# Patient Record
Sex: MALE | Race: BLACK OR AFRICAN AMERICAN | Employment: FULL TIME | URBAN - METROPOLITAN AREA
[De-identification: names, ages, dates, MRNs, and addresses within clinical notes are randomized per-mention and may not be internally consistent; named-entity substitution may affect disease eponyms.]

---

## 2017-01-04 ENCOUNTER — OFFICE VISIT (OUTPATIENT)
Dept: BARIATRICS/WEIGHT MGMT | Age: 37
End: 2017-01-04

## 2017-01-04 VITALS
DIASTOLIC BLOOD PRESSURE: 89 MMHG | HEART RATE: 68 BPM | HEIGHT: 68 IN | SYSTOLIC BLOOD PRESSURE: 131 MMHG | WEIGHT: 248 LBS | BODY MASS INDEX: 37.59 KG/M2

## 2017-01-04 DIAGNOSIS — R73.03 PRE-DIABETES: ICD-10-CM

## 2017-01-04 DIAGNOSIS — E66.9 OBESITY (BMI 30-39.9): ICD-10-CM

## 2017-01-04 DIAGNOSIS — E78.5 HYPERLIPIDEMIA, UNSPECIFIED: ICD-10-CM

## 2017-01-04 DIAGNOSIS — Z90.3 S/P GASTRECTOMY: Primary | ICD-10-CM

## 2017-01-04 PROCEDURE — 99213 OFFICE O/P EST LOW 20 MIN: CPT | Performed by: NURSE PRACTITIONER

## 2017-01-04 RX ORDER — CYCLOBENZAPRINE HCL 5 MG
TABLET ORAL
Refills: 0 | COMMUNITY
Start: 2016-11-22

## 2017-01-04 RX ORDER — AMLODIPINE BESYLATE 5 MG/1
10 TABLET ORAL
COMMUNITY
Start: 2016-12-07

## 2017-01-04 RX ORDER — HYDROCODONE BITARTRATE AND ACETAMINOPHEN 5; 325 MG/1; MG/1
TABLET ORAL
Refills: 0 | COMMUNITY
Start: 2016-11-22 | End: 2019-03-18 | Stop reason: ALTCHOICE

## 2017-01-04 ASSESSMENT — ENCOUNTER SYMPTOMS
EYES NEGATIVE: 1
ALLERGIC/IMMUNOLOGIC NEGATIVE: 1
RESPIRATORY NEGATIVE: 1
GASTROINTESTINAL NEGATIVE: 1

## 2017-08-07 ENCOUNTER — OFFICE VISIT (OUTPATIENT)
Dept: BARIATRICS/WEIGHT MGMT | Age: 37
End: 2017-08-07

## 2017-08-07 VITALS
BODY MASS INDEX: 37.44 KG/M2 | HEIGHT: 68 IN | DIASTOLIC BLOOD PRESSURE: 80 MMHG | RESPIRATION RATE: 16 BRPM | WEIGHT: 247 LBS | HEART RATE: 86 BPM | SYSTOLIC BLOOD PRESSURE: 136 MMHG

## 2017-08-07 DIAGNOSIS — E78.5 HYPERLIPIDEMIA, UNSPECIFIED: ICD-10-CM

## 2017-08-07 DIAGNOSIS — E66.9 OBESITY (BMI 30-39.9): ICD-10-CM

## 2017-08-07 DIAGNOSIS — R73.03 PRE-DIABETES: ICD-10-CM

## 2017-08-07 DIAGNOSIS — Z90.3 S/P GASTRECTOMY: Primary | ICD-10-CM

## 2017-08-07 PROCEDURE — 99213 OFFICE O/P EST LOW 20 MIN: CPT | Performed by: NURSE PRACTITIONER

## 2017-08-07 RX ORDER — ANASTROZOLE 1 MG/1
TABLET ORAL
Refills: 6 | COMMUNITY
Start: 2017-07-05 | End: 2019-03-18 | Stop reason: ALTCHOICE

## 2017-08-07 RX ORDER — BENAZEPRIL HYDROCHLORIDE 20 MG/1
TABLET ORAL
Refills: 2 | COMMUNITY
Start: 2017-07-14 | End: 2018-02-05 | Stop reason: ALTCHOICE

## 2017-08-07 ASSESSMENT — ENCOUNTER SYMPTOMS
GASTROINTESTINAL NEGATIVE: 1
ALLERGIC/IMMUNOLOGIC NEGATIVE: 1
RESPIRATORY NEGATIVE: 1
EYES NEGATIVE: 1

## 2017-11-08 ENCOUNTER — OFFICE VISIT (OUTPATIENT)
Dept: BARIATRICS/WEIGHT MGMT | Age: 37
End: 2017-11-08

## 2017-11-08 VITALS
HEART RATE: 96 BPM | DIASTOLIC BLOOD PRESSURE: 74 MMHG | RESPIRATION RATE: 16 BRPM | SYSTOLIC BLOOD PRESSURE: 110 MMHG | HEIGHT: 68 IN | WEIGHT: 235 LBS | BODY MASS INDEX: 35.61 KG/M2

## 2017-11-08 DIAGNOSIS — E78.5 HYPERLIPIDEMIA, UNSPECIFIED HYPERLIPIDEMIA TYPE: ICD-10-CM

## 2017-11-08 DIAGNOSIS — Z90.3 S/P GASTRECTOMY: Primary | ICD-10-CM

## 2017-11-08 DIAGNOSIS — E66.9 OBESITY (BMI 30-39.9): ICD-10-CM

## 2017-11-08 DIAGNOSIS — R73.03 PRE-DIABETES: ICD-10-CM

## 2017-11-08 PROCEDURE — 99213 OFFICE O/P EST LOW 20 MIN: CPT | Performed by: NURSE PRACTITIONER

## 2017-11-08 RX ORDER — ERGOCALCIFEROL 1.25 MG/1
50000 CAPSULE ORAL
COMMUNITY
Start: 2017-10-20

## 2017-11-08 ASSESSMENT — ENCOUNTER SYMPTOMS
EYES NEGATIVE: 1
RESPIRATORY NEGATIVE: 1
ALLERGIC/IMMUNOLOGIC NEGATIVE: 1
GASTROINTESTINAL NEGATIVE: 1

## 2017-11-08 NOTE — PROGRESS NOTES
T PO QOD, Disp: , Rfl: 6    benazepril (LOTENSIN) 20 MG tablet, TK 1 T PO D, Disp: , Rfl: 2    amLODIPine (NORVASC) 5 MG tablet, Take 10 mg by mouth , Disp: , Rfl:     cyclobenzaprine (FLEXERIL) 5 MG tablet, TK 1 T PO TID PRN, Disp: , Rfl: 0    HYDROcodone-acetaminophen (NORCO) 5-325 MG per tablet, TK 1 TABLET PO EVERY 4 TO 6 HOURS AS DIRECTED FOR NECK PAIN, Disp: , Rfl: 0    montelukast (SINGULAIR) 10 MG tablet, Take 1 tablet by mouth daily, Disp: , Rfl: 3    Multiple Vitamin (MULTI VITAMIN MENS PO), Take 1 each by mouth daily. , Disp: , Rfl:     albuterol (VENTOLIN HFA) 108 (90 BASE) MCG/ACT inhaler, Inhale 2 puffs into the lungs every 4 hours as needed for Wheezing., Disp: 18 g, Rfl: 4    Cyanocobalamin (VITAMIN B 12) 100 MCG LOZG, Take 2,000 mg by mouth daily. , Disp: , Rfl:     Cholecalciferol (VITAMIN D3) 57510 UNITS CAPS, Take 50,000 Units by mouth daily. , Disp: , Rfl:     Calcium Citrate-Vitamin D (CITRACAL PETITES/VITAMIN D PO), Take 1 tablet by mouth daily. , Disp: , Rfl:     omeprazole (PRILOSEC) 40 MG capsule, Take 1 capsule by mouth daily. , Disp: 30 capsule, Rfl: 2    budesonide-formoterol (SYMBICORT) 160-4.5 MCG/ACT AERO, Inhale 2 puffs into the lungs 2 times daily. , Disp: 3 Inhaler, Rfl: 1    Review of Systems   Constitutional: Negative. HENT: Negative. Eyes: Negative. Respiratory: Negative. Cardiovascular: Negative. Gastrointestinal: Negative. Endocrine: Negative. Genitourinary: Negative. Musculoskeletal: Negative. Skin: Negative. Allergic/Immunologic: Negative. Neurological: Negative. Hematological: Negative. Psychiatric/Behavioral: Negative. Objective:   Physical Exam   Constitutional: He is oriented to person, place, and time. He appears well-developed and well-nourished. HENT:   Head: Normocephalic and atraumatic. Eyes: Conjunctivae are normal. Pupils are equal, round, and reactive to light. Neck: Normal range of motion. Neck supple. Cardiovascular: Normal rate. Pulmonary/Chest: Effort normal.   Abdominal: Soft. Musculoskeletal: Normal range of motion. Neurological: He is alert and oriented to person, place, and time. Skin: Skin is warm and dry. Psychiatric: He has a normal mood and affect. His behavior is normal. Judgment and thought content normal.   Vitals reviewed. Assessment and Plan:   Patient is 3 years 3 months s/p sleeve gastrectomy, down 12 lbs with a total weight loss of 96.5 lbs. The patient's current Body mass index is 35.73 kg/m². (11/8/17). He is doing well, denies n/v/dysphagia or reflux. He is tolerating diet, getting adequate fluids and protein. Doing well with vegan diet. He is exercising with walking daily 3-4 miles. Encouraged continued physical activity. He was only taking one multivitamin and no Citracal, so instructed patient to take two multivitamins and Citracal per day. He did meet with the registered dietitian for continued follow up. I agree with recommendations and plan. Reviewed recent labs completed by PCP. His PCP has started him on a Vitamin D supplement. We will see him back in 3 months for continued follow up. A total of 15 minutes was spent face-to-face with the patient and over half of that time was spent counseling the patient on proper dietary behaviors, exercise and post-op progress.

## 2018-02-05 ENCOUNTER — OFFICE VISIT (OUTPATIENT)
Dept: BARIATRICS/WEIGHT MGMT | Age: 38
End: 2018-02-05

## 2018-02-05 VITALS
WEIGHT: 234 LBS | SYSTOLIC BLOOD PRESSURE: 114 MMHG | HEIGHT: 68 IN | DIASTOLIC BLOOD PRESSURE: 74 MMHG | BODY MASS INDEX: 35.46 KG/M2 | HEART RATE: 93 BPM

## 2018-02-05 DIAGNOSIS — E78.5 HYPERLIPIDEMIA, UNSPECIFIED HYPERLIPIDEMIA TYPE: ICD-10-CM

## 2018-02-05 DIAGNOSIS — Z90.3 S/P GASTRECTOMY: Primary | ICD-10-CM

## 2018-02-05 DIAGNOSIS — E55.9 VITAMIN D DEFICIENCY: ICD-10-CM

## 2018-02-05 DIAGNOSIS — I10 ESSENTIAL HYPERTENSION: ICD-10-CM

## 2018-02-05 PROCEDURE — 99213 OFFICE O/P EST LOW 20 MIN: CPT | Performed by: NURSE PRACTITIONER

## 2018-02-05 RX ORDER — BENAZEPRIL HYDROCHLORIDE AND HYDROCHLOROTHIAZIDE 20; 12.5 MG/1; MG/1
TABLET ORAL
COMMUNITY
Start: 2018-01-31 | End: 2019-07-22 | Stop reason: ALTCHOICE

## 2018-02-05 ASSESSMENT — ENCOUNTER SYMPTOMS
GASTROINTESTINAL NEGATIVE: 1
RESPIRATORY NEGATIVE: 1
EYES NEGATIVE: 1
ALLERGIC/IMMUNOLOGIC NEGATIVE: 1

## 2018-02-05 NOTE — PATIENT INSTRUCTIONS
Patient received dietary handouts and education. Diet tips to help make you successful postoperatively  Eating habits after surgery will have to be a permanent and long-term change. Eating habits are so ingrained that it can be difficult to change. It is important to maintain these new eating habits after surgery. Also remember that overall health, age, and genetics make each persons weight loss progress different. Do not compare your progress, the amount you eat, or exercise to other patients.  Protein first at every meal- Eat the protein portion of your meal first. Eating protein helps the body feel full and sends a signal to stop eating. Protein is very important in building tissue in the body.  Eat at least 4 times per day- This includes protein supplements and small meals with a high amount of protein   Chewing your food thoroughly- Eating too quickly and improper chewing can cause pain and vomiting after surgery.  Slowing down the speed at which you eat- Refill your fork only after you swallow. Adopt a new pattern of eating by taking a bite of food and putting your utensil down between bites. This will help to reduce the feeling of food being stuck.    Drink water and start drinking fluids slowly- Drink at least 48 ounces per day minimum. Sip fluids as if they were hot beverages. If you find it difficult to stop gulping liquids, try using a sippy cup or a sport top water bottle.  Make sure you are eating meals without drinking fluids- After surgery you will not be allowed to drink fluids 30 minutes before, during, or 30 minutes after your meal (30/30/30 rule). This will be a life-long behavior change. The reason for the rule is to keep food from passing through your smaller stomach more rapidly.  This will cause you to feel hungry shortly after your meal.   Continue to avoid caffeine and carbonated beverages- Caffeine acts as a diuretic and can be dehydrating as well as irritating to the lining of the stomach. Carbonated beverages release gas and can expand the stomach.  Continue to keep temptation from your kitchen- Keep your pantry and kitchen cabinets cleaned out of those dangerous foods that might tempt you after surgery (chips, cookies, candy, etc.).  Continue to increase your exercise program- Increase your daily physical activity. Aim for 5-6 days per week for 30 minutes. Walking is an easy way to get started with exercising. Exercise is going to be a regular part of your life after surgery.  Make sure you have a good support system- There will be many changes and adjustments to make after surgery. It is important to have a supportive friend, family member or co-worker, etc. with whom you can talk. Continue to attend [facility]) Weight Management support groups as they can be helpful in maintaining behaviors. In addition, it is the responsibility of the patient to schedule and follow up on labs and tests completed after surgery. Results will be reviewed at each visit.

## 2018-02-05 NOTE — PROGRESS NOTES
capsule, Take 50,000 Units by mouth, Disp: , Rfl:     anastrozole (ARIMIDEX) 1 MG tablet, TK 1 T PO QOD, Disp: , Rfl: 6    amLODIPine (NORVASC) 5 MG tablet, Take 10 mg by mouth , Disp: , Rfl:     cyclobenzaprine (FLEXERIL) 5 MG tablet, TK 1 T PO TID PRN, Disp: , Rfl: 0    HYDROcodone-acetaminophen (NORCO) 5-325 MG per tablet, TK 1 TABLET PO EVERY 4 TO 6 HOURS AS DIRECTED FOR NECK PAIN, Disp: , Rfl: 0    montelukast (SINGULAIR) 10 MG tablet, Take 1 tablet by mouth daily, Disp: , Rfl: 3    Multiple Vitamin (MULTI VITAMIN MENS PO), Take 1 each by mouth daily. , Disp: , Rfl:     albuterol (VENTOLIN HFA) 108 (90 BASE) MCG/ACT inhaler, Inhale 2 puffs into the lungs every 4 hours as needed for Wheezing., Disp: 18 g, Rfl: 4    Cyanocobalamin (VITAMIN B 12) 100 MCG LOZG, Take 2,000 mg by mouth daily. , Disp: , Rfl:     Cholecalciferol (VITAMIN D3) 79808 UNITS CAPS, Take 50,000 Units by mouth daily. , Disp: , Rfl:     Calcium Citrate-Vitamin D (CITRACAL PETITES/VITAMIN D PO), Take 1 tablet by mouth daily. , Disp: , Rfl:     omeprazole (PRILOSEC) 40 MG capsule, Take 1 capsule by mouth daily. , Disp: 30 capsule, Rfl: 2    budesonide-formoterol (SYMBICORT) 160-4.5 MCG/ACT AERO, Inhale 2 puffs into the lungs 2 times daily. , Disp: 3 Inhaler, Rfl: 1    Review of Systems   Constitutional: Negative. HENT: Negative. Eyes: Negative. Respiratory: Negative. Cardiovascular: Negative. Gastrointestinal: Negative. Endocrine: Negative. Genitourinary: Negative. Musculoskeletal: Negative. Skin: Negative. Allergic/Immunologic: Negative. Neurological: Negative. Hematological: Negative. Psychiatric/Behavioral: Negative. Objective:   Physical Exam   Constitutional: He is oriented to person, place, and time. He appears well-developed and well-nourished. HENT:   Head: Normocephalic and atraumatic. Eyes: Conjunctivae are normal. Pupils are equal, round, and reactive to light.    Neck: Normal range

## 2018-06-06 ENCOUNTER — OFFICE VISIT (OUTPATIENT)
Dept: BARIATRICS/WEIGHT MGMT | Age: 38
End: 2018-06-06

## 2018-06-06 VITALS
RESPIRATION RATE: 16 BRPM | HEART RATE: 94 BPM | DIASTOLIC BLOOD PRESSURE: 70 MMHG | BODY MASS INDEX: 34.71 KG/M2 | WEIGHT: 229 LBS | SYSTOLIC BLOOD PRESSURE: 118 MMHG | HEIGHT: 68 IN

## 2018-06-06 DIAGNOSIS — E78.5 HYPERLIPIDEMIA, UNSPECIFIED HYPERLIPIDEMIA TYPE: ICD-10-CM

## 2018-06-06 DIAGNOSIS — Z90.3 S/P GASTRECTOMY: Primary | ICD-10-CM

## 2018-06-06 DIAGNOSIS — R73.03 PRE-DIABETES: ICD-10-CM

## 2018-06-06 DIAGNOSIS — I10 ESSENTIAL HYPERTENSION: ICD-10-CM

## 2018-06-06 DIAGNOSIS — E66.9 OBESITY (BMI 30-39.9): ICD-10-CM

## 2018-06-06 PROCEDURE — 99213 OFFICE O/P EST LOW 20 MIN: CPT | Performed by: NURSE PRACTITIONER

## 2018-09-17 ENCOUNTER — OFFICE VISIT (OUTPATIENT)
Dept: BARIATRICS/WEIGHT MGMT | Age: 38
End: 2018-09-17

## 2018-09-17 VITALS
OXYGEN SATURATION: 99 % | WEIGHT: 214.4 LBS | RESPIRATION RATE: 20 BRPM | DIASTOLIC BLOOD PRESSURE: 83 MMHG | BODY MASS INDEX: 32.49 KG/M2 | HEIGHT: 68 IN | SYSTOLIC BLOOD PRESSURE: 137 MMHG | HEART RATE: 77 BPM

## 2018-09-17 DIAGNOSIS — R73.03 PRE-DIABETES: ICD-10-CM

## 2018-09-17 DIAGNOSIS — I10 ESSENTIAL HYPERTENSION: ICD-10-CM

## 2018-09-17 DIAGNOSIS — E78.5 HYPERLIPIDEMIA, UNSPECIFIED HYPERLIPIDEMIA TYPE: ICD-10-CM

## 2018-09-17 DIAGNOSIS — E66.9 OBESITY (BMI 30.0-34.9): ICD-10-CM

## 2018-09-17 DIAGNOSIS — Z90.3 S/P GASTRECTOMY: Primary | ICD-10-CM

## 2018-09-17 PROCEDURE — 99213 OFFICE O/P EST LOW 20 MIN: CPT | Performed by: NURSE PRACTITIONER

## 2018-09-17 ASSESSMENT — ENCOUNTER SYMPTOMS
ALLERGIC/IMMUNOLOGIC NEGATIVE: 1
RESPIRATORY NEGATIVE: 1
GASTROINTESTINAL NEGATIVE: 1
EYES NEGATIVE: 1

## 2018-09-17 NOTE — PROGRESS NOTES
[facility]) Physicians   [facility]    Subjective:      Patient ID: Semaj Pruett is a 41 y.o. male    HPI    6 years 1 month s/p sleeve gastrectomy    Semaj Pruett is a very pleasant 41 y.o. obese male , Body mass index is 32.6 kg/m². Patient denies any nausea, vomiting, fevers, chills, shortness of breath, chest pain, constipation or urinary symptoms. Denies any heartburn nor dysphagia. Past Medical History:   Diagnosis Date    Allergic rhinitis     Asthma 1982    B12 deficiency     Bipolar affect, depressed ([facility] [address]     Dr. Myriam Hernandes Hyperlipidemia     Migraine     Morbid obesity ([facility] [address]     Panic disorder     Pre-diabetes      Past Surgical History:   Procedure Laterality Date    SLEEVE GASTRECTOMY  8/26/2014    LAPAROSCOPIC    TONSILLECTOMY  0273    UMBILICAL HERNIA REPAIR  1998     Family History   Problem Relation Age of Onset    Diabetes Mother     High Blood Pressure Mother     High Cholesterol Mother     Asthma Mother     Diabetes Father     High Blood Pressure Father     High Cholesterol Father     Diabetes Maternal Aunt     Seizures Brother     Substance Abuse Brother     Glaucoma Brother      Social History   Substance Use Topics    Smoking status: Former Smoker     Quit date: 1/29/2013    Smokeless tobacco: Never Used    Alcohol use Yes      Comment: weekends - 2-3 drinks     I counseled the patient on the importance of not smoking and risks of ETOH. Allergies   Allergen Reactions    Banana Anaphylaxis    Peanuts [Peanut Oil] Anaphylaxis    Shellfish-Derived Products Anaphylaxis    Penicillins      Unsure of reaction     Vitals:    09/17/18 1504   BP: 137/83   Site: Right Wrist   Position: Sitting   Pulse: 77   Resp: 20   SpO2: 99%   Weight: 214 lb 6.4 oz (97.3 kg)   Height: 5' 8\" (1.727 m)     Body mass index is 32.6 kg/m².     Current Outpatient Prescriptions:     benazepril-hydrochlorthiazide (LOTENSIN HCT) 20-12.5 MG per tablet, Take by mouth, Cardiovascular: Normal rate. Pulmonary/Chest: Effort normal.   Abdominal: Soft. Musculoskeletal: Normal range of motion. Neurological: He is alert and oriented to person, place, and time. Skin: Skin is warm and dry. Psychiatric: He has a normal mood and affect. His behavior is normal. Judgment and thought content normal.   Vitals reviewed. Assessment and Plan:   Patient is 6 years 1 month s/p sleeve gastrectomy, down 14.6 lbs with a total weight loss of 117.1 lbs. The patient's current Body mass index is 32.6 kg/m². (9/17/18). He is doing well, denies n/v/dysphagia or reflux. He is tolerating diet, getting adequate fluids and protein. Needs to be more consistent with following the 30-30-30 rule and watch hi alcohol intake. He is exercising with walking at work getting generally 10,000 steps on weekdays and 20,000 on the weekend days. Encouraged continued physical activity. He is still only taking one multivitamin so instructed him to make sure he is taking tweo daily. He is taking two Citracal daily as instructed. He did meet with the registered dietitian for continued follow up. I agree with recommendations and plan. We will see him back in 3 months for continued follow up. Will repeat nutritional labs at next visit. A total of 15 minutes was spent face-to-face with the patient and over half of that time was spent counseling the patient on proper dietary behaviors, exercise and post-op progress.

## 2018-09-17 NOTE — PROGRESS NOTES
Dietary Assessment Note    Vitals:   Vitals:    09/17/18 1504   Resp: 20   Weight: 214 lb 6.4 oz (97.3 kg)   Height: 5' 8\" (1.727 m)    Patient lost 14.6 lbs over past 3.5 months. 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 On most week days gets ~ 10 K. Nutrition Assessment:  4 year s/p sleeve post-op visit. Pt follows a vegan diet. Is allergic to all nuts except coconut. Pt does not track calories. He has been trying to follow a lower carb diet (trying to stick around 20 g/CHO). Reports his clothes are fitting looser on him. Breakfast: nothing     Snack: nothing     Lunch: 11 a.m. veggies (kale, broccoli, green beans) with vegan sausages     Snack: veggies (kale, broccoli, green beans) with vegan sausages OR pumpkin seeds     Dinner: veggies (kale, broccoli, green beans) with vegan sausages     Snack:  Might have vegan chicken strips (6 strips)     Fluids: Water, SF water flavorings, alcohol (wine OR gin)     Amount able to eat at a time? 1 cup      Following 30-30-30 Rule? Has to eat fast at work, when at home eats in 30-45 minutes, does eat and drink together and does not feel uncomfortable. Food Intolerances/issues: none    Client Concerns: nothing     Goals: eat at least 4 x day, take MVI bid, separate eating and drinking, limit alcohol.      Plan: F/U at 5 years      Steven Palomo

## 2018-12-17 ENCOUNTER — OFFICE VISIT (OUTPATIENT)
Dept: BARIATRICS/WEIGHT MGMT | Age: 38
End: 2018-12-17
Payer: COMMERCIAL

## 2018-12-17 VITALS
DIASTOLIC BLOOD PRESSURE: 77 MMHG | SYSTOLIC BLOOD PRESSURE: 127 MMHG | OXYGEN SATURATION: 98 % | HEIGHT: 68 IN | HEART RATE: 80 BPM | BODY MASS INDEX: 32.77 KG/M2 | WEIGHT: 216.2 LBS | RESPIRATION RATE: 19 BRPM

## 2018-12-17 DIAGNOSIS — E78.5 HYPERLIPIDEMIA, UNSPECIFIED HYPERLIPIDEMIA TYPE: ICD-10-CM

## 2018-12-17 DIAGNOSIS — Z90.3 S/P GASTRECTOMY: Primary | ICD-10-CM

## 2018-12-17 DIAGNOSIS — I10 ESSENTIAL HYPERTENSION: ICD-10-CM

## 2018-12-17 DIAGNOSIS — E66.9 OBESITY (BMI 30-39.9): ICD-10-CM

## 2018-12-17 PROCEDURE — 99213 OFFICE O/P EST LOW 20 MIN: CPT | Performed by: NURSE PRACTITIONER

## 2018-12-17 ASSESSMENT — ENCOUNTER SYMPTOMS
EYES NEGATIVE: 1
ALLERGIC/IMMUNOLOGIC NEGATIVE: 1
GASTROINTESTINAL NEGATIVE: 1
RESPIRATORY NEGATIVE: 1

## 2018-12-17 NOTE — PATIENT INSTRUCTIONS
Patient received dietary handouts and education. Diet tips to help make you successful postoperatively  Eating habits after surgery will have to be a permanent and long-term change. Eating habits are so ingrained that it can be difficult to change. It is important to maintain these new eating habits after surgery. Also remember that overall health, age, and genetics make each persons weight loss progress different. Do not compare your progress, the amount you eat, or exercise to other patients.  Protein first at every meal- Eat the protein portion of your meal first. Eating protein helps the body feel full and sends a signal to stop eating. Protein is very important in building tissue in the body.  Eat at least 4 times per day- This includes protein supplements and small meals with a high amount of protein   Chewing your food thoroughly- Eating too quickly and improper chewing can cause pain and vomiting after surgery.  Slowing down the speed at which you eat- Refill your fork only after you swallow. Adopt a new pattern of eating by taking a bite of food and putting your utensil down between bites. This will help to reduce the feeling of food being stuck.    Drink water and start drinking fluids slowly- Drink at least 48 ounces per day minimum. Sip fluids as if they were hot beverages. If you find it difficult to stop gulping liquids, try using a sippy cup or a sport top water bottle.  Make sure you are eating meals without drinking fluids- After surgery you will not be allowed to drink fluids 30 minutes before, during, or 30 minutes after your meal (30/30/30 rule). This will be a life-long behavior change. The reason for the rule is to keep food from passing through your smaller stomach more rapidly.  This will cause you to feel hungry shortly after your meal.   Continue to avoid caffeine and carbonated beverages- Caffeine acts as a diuretic and can be dehydrating as well as irritating to the lining of the stomach. Carbonated beverages release gas and can expand the stomach.  Continue to keep temptation from your kitchen- Keep your pantry and kitchen cabinets cleaned out of those dangerous foods that might tempt you after surgery (chips, cookies, candy, etc.).  Continue to increase your exercise program- Increase your daily physical activity. Aim for 5-6 days per week for 30 minutes. Walking is an easy way to get started with exercising. Exercise is going to be a regular part of your life after surgery.  Make sure you have a good support system- There will be many changes and adjustments to make after surgery. It is important to have a supportive friend, family member or co-worker, etc. with whom you can talk. Continue to attend [facility]) Weight Management support groups as they can be helpful in maintaining behaviors. In addition, it is the responsibility of the patient to schedule and follow up on labs and tests completed after surgery. Results will be reviewed at each visit.

## 2018-12-17 NOTE — PROGRESS NOTES
Dietary Assessment Note    Vitals:   Vitals:    12/17/18 1510   Height: 5' 8\" (1.727 m)    Patient gained 1.8 lbs over past 3 months. Total Weight Loss: 115.3 lbs     Labs reviewed: 5/2/18 K 3.5 L, Vit D 25-hydroxy L (increasing), Glu 87 WNL, Vit B12 WNL. Chol 215 H (increasing), LDL H (increasing),  WNL    Protein intake: Patient not tracking    Fluid intake: >64 oz/day    Multivitamin/mineral intake: 1 Vegan MVI/day     Calcium intake:  2 citracal petite     Other:  Vit D, B12    Exercise:  Had been on elliptical on a regular basis but then on Thanksgiving got a pinched nerve in trapezius and went to ER. Started PT last week- plans to go 2 x week. 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 Nutrition Assessment: 6 year 3 mo s/p sleeve post-op visit. Pt follows a vegan diet. Is allergic to all nuts except coconut, fish, bananas, all seafood. Tries to follow a lower CHO diet and goal of staying around 20 g/CHO/day- one of the reasons he does this is so he avoids binging on high CHO foods he enjoys. Trying to stay away from soy which he knows is a large component of vegan foods. Reports he wants to get back on track with taking apple cider vinegar which he feels is helpful with his weight loss. Pt feels like he has plenty of food options however RD concerned about balance in diet d/t Vegan food preference/trying to limit CHO/ several food allergies/avoiding soy which is leaving him with limited food choices.      Breakfast: Vegan Protein shakes made with water or coconut milk     Snack: nothing     Lunch: (11 a.m.) leftovers from dinner     Snack: sometimes- may not feel hungry (animal crackers/saltines/chips)     Dinner: pizza OR vegan fish filet in air fryer with broccoli and michelle kraut OR chili (pickles/jalapenos/onions) with tofu crumbles - limiting beans d/t trying to follow lower CHO diet (but states this is

## 2019-03-18 ENCOUNTER — OFFICE VISIT (OUTPATIENT)
Dept: BARIATRICS/WEIGHT MGMT | Age: 39
End: 2019-03-18
Payer: COMMERCIAL

## 2019-03-18 VITALS
BODY MASS INDEX: 32.46 KG/M2 | SYSTOLIC BLOOD PRESSURE: 115 MMHG | WEIGHT: 214.2 LBS | HEART RATE: 90 BPM | HEIGHT: 68 IN | DIASTOLIC BLOOD PRESSURE: 74 MMHG

## 2019-03-18 DIAGNOSIS — Z90.3 S/P GASTRECTOMY: Primary | ICD-10-CM

## 2019-03-18 DIAGNOSIS — R73.03 PRE-DIABETES: ICD-10-CM

## 2019-03-18 DIAGNOSIS — E66.9 OBESITY (BMI 30-39.9): ICD-10-CM

## 2019-03-18 DIAGNOSIS — E78.5 HYPERLIPIDEMIA, UNSPECIFIED HYPERLIPIDEMIA TYPE: ICD-10-CM

## 2019-03-18 DIAGNOSIS — I10 ESSENTIAL HYPERTENSION: ICD-10-CM

## 2019-03-18 PROCEDURE — 99213 OFFICE O/P EST LOW 20 MIN: CPT | Performed by: NURSE PRACTITIONER

## 2019-03-18 RX ORDER — NAPROXEN 500 MG/1
500 TABLET ORAL 2 TIMES DAILY WITH MEALS
COMMUNITY

## 2019-03-18 ASSESSMENT — ENCOUNTER SYMPTOMS
RESPIRATORY NEGATIVE: 1
EYES NEGATIVE: 1
GASTROINTESTINAL NEGATIVE: 1
ALLERGIC/IMMUNOLOGIC NEGATIVE: 1

## 2019-06-14 ASSESSMENT — ENCOUNTER SYMPTOMS
RESPIRATORY NEGATIVE: 1
ALLERGIC/IMMUNOLOGIC NEGATIVE: 1
EYES NEGATIVE: 1
GASTROINTESTINAL NEGATIVE: 1

## 2019-07-22 ENCOUNTER — OFFICE VISIT (OUTPATIENT)
Dept: BARIATRICS/WEIGHT MGMT | Age: 39
End: 2019-07-22
Payer: COMMERCIAL

## 2019-07-22 VITALS
DIASTOLIC BLOOD PRESSURE: 80 MMHG | HEART RATE: 70 BPM | BODY MASS INDEX: 34.1 KG/M2 | HEIGHT: 68 IN | RESPIRATION RATE: 16 BRPM | SYSTOLIC BLOOD PRESSURE: 124 MMHG | WEIGHT: 225 LBS

## 2019-07-22 DIAGNOSIS — E66.9 OBESITY (BMI 30.0-34.9): ICD-10-CM

## 2019-07-22 DIAGNOSIS — Z90.3 S/P GASTRECTOMY: ICD-10-CM

## 2019-07-22 DIAGNOSIS — I10 ESSENTIAL HYPERTENSION: ICD-10-CM

## 2019-07-22 DIAGNOSIS — R73.03 PRE-DIABETES: ICD-10-CM

## 2019-07-22 DIAGNOSIS — E78.5 HYPERLIPIDEMIA, UNSPECIFIED HYPERLIPIDEMIA TYPE: Primary | ICD-10-CM

## 2019-07-22 PROCEDURE — 99213 OFFICE O/P EST LOW 20 MIN: CPT | Performed by: NURSE PRACTITIONER

## 2019-07-22 NOTE — PROGRESS NOTES
Dietary Assessment Note    Vitals:   Vitals:    07/22/19 1534   BP: 124/80   Pulse: 70   Resp: 16   Weight: 225 lb (102.1 kg)   Height: 5' 8\" (1.727 m)    Patient gained 10.8 lbs over 4 months. Total Weight Loss: 102.5 lbs    Labs reviewed: no lab studies available for review at time of visit    Protein intake: Patient not tracking. Feels he reaches goal and will use protein shake on busy days. Fluid intake: 48-64 oz/day water, tea/coffee w/ sf syrup + splenda, regular lemonade, + 1 alcoholic beverage/day     Multivitamin/mineral intake: 2 Vegan MVI/day      Calcium intake:  2 citracal petite      Other: Vit D, B12     Exercise: Lifting restriction <20# for another month. Will play basketball, dose shows/dancing, 10K steps/day     Nutrition Assessment: 4 years 11 months post-op visit. Pt follows a vegan diet. Is allergic to all nuts except coconut, fish, bananas, all seafood.   Breakfast: Just coffee - doesn't feel hungry until 11 AM    Lunch: 11 AM: Leftovers ([employer] spaghetti)    Snack: Maybe \"meatloaf\" made w/ chickpeas + national yeast + wheat gluten     Dinner: 5-6:30 PM: Lettuce wrap w/ vegan lunch meat/onion/pickles/mustard/vegan garcia + beans     Snack: Sometimes Chips OR Cookies     Amount able to eat per sitting: ~1.5 cups  Following 30/30/30 rule: No - Feels collins and less tempted to over eat. Eats quickly.     Food Intolerances/issues: none    Client Concerns: none    Goals: Personal goal: <200 lbs  - Eat within one hour of waking up  - Eliminate lemonade and alcohol   - Increase variety of produce w/ all meals and snacks   - Increase exercise to PT exercise 15-30 minutes daily     Plan: Follow up at 6 years post op and as needed    [employer]

## 2020-01-24 ASSESSMENT — ENCOUNTER SYMPTOMS
EYES NEGATIVE: 1
GASTROINTESTINAL NEGATIVE: 1
ALLERGIC/IMMUNOLOGIC NEGATIVE: 1
RESPIRATORY NEGATIVE: 1

## 2020-01-24 NOTE — PROGRESS NOTES
[facility]) Physicians   [employer]    Subjective:      Patient ID: Rubi Starkey is a 42 y.o. male    HPI    5 years 5 months s/p sleeve gastrectomy    Rubi Starkey is a 42 y.o. obese male , Body mass index is 31.63 kg/m². Patient denies any nausea, vomiting, fevers, chills, shortness of breath, chest pain, constipation or urinary symptoms. Denies any heartburn nor dysphagia. Past Medical History:   Diagnosis Date    Allergic rhinitis     Asthma 1982    B12 deficiency     Bipolar affect, depressed ([facility] [address]     Dr. Ramirez Broussard Hyperlipidemia     Migraine     Morbid obesity ([facility] [address]     Panic disorder     Pre-diabetes      Past Surgical History:   Procedure Laterality Date    SLEEVE GASTRECTOMY  2014    LAPAROSCOPIC    TONSILLECTOMY  4680    UMBILICAL HERNIA REPAIR       Family History   Problem Relation Age of Onset    Diabetes Mother     High Blood Pressure Mother     High Cholesterol Mother     Asthma Mother     Diabetes Father     High Blood Pressure Father     High Cholesterol Father     Diabetes Maternal Aunt     Seizures Brother     Substance Abuse Brother     Glaucoma Brother      Social History     Tobacco Use    Smoking status: Former Smoker     Last attempt to quit: 2013     Years since quittin.9    Smokeless tobacco: Never Used   Substance Use Topics    Alcohol use: Yes     Comment: weekends - 2-3 drinks     I counseled the patient on the importance of not smoking and risks of ETOH. Allergies   Allergen Reactions    Banana Anaphylaxis    Peanuts [Peanut Oil] Anaphylaxis    Shellfish-Derived Products Anaphylaxis    Benazepril-Hydrochlorothiazide Swelling    Penicillins      Unsure of reaction     Vitals:    20 1552   BP: 137/80   Pulse: 108   Weight: 208 lb (94.3 kg)   Height: 5' 8\" (1.727 m)     Body mass index is 31.63 kg/m².     Current Outpatient Medications:     naproxen (NAPROSYN) 500 MG tablet, Take 500 mg by mouth 2 times daily (with meals) Indications: PRN, Disp: , Rfl:     vitamin D (ERGOCALCIFEROL) 47480 units CAPS capsule, Take 50,000 Units by mouth, Disp: , Rfl:     amLODIPine (NORVASC) 5 MG tablet, Take 10 mg by mouth , Disp: , Rfl:     cyclobenzaprine (FLEXERIL) 5 MG tablet, TK 1 T PO TID PRN, Disp: , Rfl: 0    montelukast (SINGULAIR) 10 MG tablet, Take 1 tablet by mouth daily, Disp: , Rfl: 3    Multiple Vitamin (MULTI VITAMIN MENS PO), Take 1 each by mouth daily. , Disp: , Rfl:     albuterol (VENTOLIN HFA) 108 (90 BASE) MCG/ACT inhaler, Inhale 2 puffs into the lungs every 4 hours as needed for Wheezing., Disp: 18 g, Rfl: 4    Cyanocobalamin (VITAMIN B 12) 100 MCG LOZG, Take 2,000 mg by mouth daily. , Disp: , Rfl:     Calcium Citrate-Vitamin D (CITRACAL PETITES/VITAMIN D PO), Take 1 tablet by mouth daily. , Disp: , Rfl:     omeprazole (PRILOSEC) 40 MG capsule, Take 1 capsule by mouth daily. , Disp: 30 capsule, Rfl: 2    budesonide-formoterol (SYMBICORT) 160-4.5 MCG/ACT AERO, Inhale 2 puffs into the lungs 2 times daily. , Disp: 3 Inhaler, Rfl: 1    Lab Results   Component Value Date    WBC 8.0 08/14/2014    RBC 4.94 08/14/2014    HGB 13.8 08/14/2014    HCT 41.4 08/14/2014    MCV 83.8 08/14/2014    MCH 27.9 08/14/2014    MCHC 33.3 08/14/2014    MPV 7.9 08/14/2014    LYMPHOPCT 37.4 03/25/2011    MONOPCT 10.9 03/25/2011    BASOPCT 0.4 03/25/2011    LYMPHSABS 2.5 03/25/2011    MONOSABS 0.7 03/25/2011    EOSABS 0.2 03/25/2011     Lab Results   Component Value Date     08/14/2014    K 3.9 08/14/2014     08/14/2014    CO2 24 08/14/2014    GLUCOSE 77 08/14/2014    BUN 17 08/14/2014    CREATININE 0.8 08/14/2014    LABGLOM >60 08/14/2014    GFRAA >60 08/14/2014    GFRAA >60 11/16/2012    CALCIUM 9.7 08/14/2014    PROT 7.4 08/14/2014    LABALBU 4.4 08/14/2014    AGRATIO 1.5 08/14/2014    BILITOT 0.4 08/14/2014    ALKPHOS 77 08/14/2014    ALT 20 08/14/2014    AST 17 08/14/2014    GLOB 3.0 08/14/2014     Lab Results Component Value Date    CHOL 205 05/28/2014    TRIG 89 05/28/2014    HDL 50 05/28/2014    HDL 69 06/28/2010    LDLCALC 137 05/28/2014    LABVLDL 18 05/28/2014     No results found for: [facility]  Lab Results   Component Value Date    IRON 71 05/28/2014    TIBC 293 05/28/2014    LABIRON 24 05/28/2014     Lab Results   Component Value Date    TWNLUBWW73 314 05/28/2014    FOLATE 14.33 05/28/2014     Lab Results   Component Value Date    VITD25 8 05/28/2014     Lab Results   Component Value Date    LABA1C 5.7 05/28/2014    .9 05/28/2014     Review of Systems   Constitutional: Negative. HENT: Negative. Eyes: Negative. Respiratory: Negative. Cardiovascular: Negative. Gastrointestinal: Negative. Endocrine: Negative. Genitourinary: Negative. Musculoskeletal: Positive for arthralgias. Neck bothering him a little again. Skin: Negative. Allergic/Immunologic: Negative. Neurological: Negative. Hematological: Negative. Psychiatric/Behavioral: Negative. Objective:   Physical Exam  Vitals signs reviewed. Constitutional:       Appearance: He is well-developed. HENT:      Head: Normocephalic and atraumatic. Eyes:      Conjunctiva/sclera: Conjunctivae normal.      Pupils: Pupils are equal, round, and reactive to light. Neck:      Musculoskeletal: Normal range of motion and neck supple. Cardiovascular:      Rate and Rhythm: Normal rate. Pulmonary:      Effort: Pulmonary effort is normal.   Abdominal:      Palpations: Abdomen is soft. Musculoskeletal: Normal range of motion. Skin:     General: Skin is warm and dry. Neurological:      Mental Status: He is alert and oriented to person, place, and time. Psychiatric:         Behavior: Behavior normal.         Thought Content: Thought content normal.         Judgment: Judgment normal.       Assessment and Plan:   Patient is 5 years 5 months s/p sleeve gastrectomy, down 17 lbs with a total weight loss of 119.5 lbs.

## 2020-01-24 NOTE — PATIENT INSTRUCTIONS
Diet tips to help make you successful postoperatively    Eating habits after surgery will need to be a long-term change. Eating habits are so ingrained that it can be difficult to change so having made these changes for surgery is a great accomplishment. It is important to maintain these new eating habits after surgery. Also, remember that overall health, age, and genetics make each person's weight loss progress different. Do not compare your progress, the amount you eat, or exercise to other patients.  Protein first at every meal- Eat the protein portion of your meal first. Eating protein helps the body feel full and sends a signal to stop eating. Protein is very important in building tissue in the body.  Eat at least 4 times per day- This includes protein supplements and small meals with a high amount of protein   Chewing your food thoroughly- Eating too quickly and improper chewing can cause pain and vomiting after surgery.  Slowing down the speed at which you eat- Refill your fork only after you swallow. Adopt a new pattern of eating by taking a bite of food and putting your utensil down between bites. This will help to reduce the feeling of food being stuck.    Drink water and other fluids slowly- Drink at least 48 ounces per day minimum. Sip fluids as if they were hot beverages. If you find it difficult to stop gulping liquids, try using a sippy cup or a sport top water bottle.  Make sure you are eating meals without drinking fluids- After surgery you will not be allowed to drink fluids 30 minutes before, during, or 30 minutes after your meal (30/30/30 rule). This will be a life-long behavior change. The reason for the rule is to keep food from passing through your smaller stomach more rapidly.  This will cause you to feel hungry again shortly after your meal.   Continue to avoid caffeine and carbonated beverages- Caffeine acts as a diuretic and can be dehydrating as well as irritating to the lining of the stomach. Carbonated beverages release gas and can expand the stomach.  Continue to keep temptation from your kitchen- Keep your pantry and kitchen cabinets cleaned out of those dangerous foods that might tempt you after surgery (chips, cookies, candy, etc.).  Continue to increase your exercise program- Increase your daily physical activity. Aim for 5-6 days per week for 30 minutes. Walking is an easy way to get started with exercising. You want exercise to be a regular part of your life after surgery.  Make sure you have a good support system- There will be many changes and adjustments to make after surgery. It is important to have a supportive friend, family member or co-worker, etc. with whom you can talk. Continue to attend [facility]) Weight Management support groups as they can be helpful in maintaining behaviors. In addition, it is the responsibility of the patient to schedule and follow up on labs and tests completed after surgery. Results will be reviewed at each visit. We recommend you have the following labs completed by your primary care doctor each year after bariatric surgery: Complete Blood Count (CBC), Complete Metabolic Panel (CMP), Iron Studies, Ha1c, Lipid Panel, TSH (Thyroid), Vitamin A, Vitamin B1, Vitamin B12 & Folate, Vitamin D and Vitamin K.      **You may receive a survey regarding the care you received during your visit. Your input is valuable to us. We encourage you to complete and return your survey. We hope you will choose us in the future for your healthcare needs. Patient received dietary handouts and education.     Goals:   - Eat 4 planned meals and snacks/day  - Take 30 minutes to eat a meal and wait 30 minutes between eating + drinking  - Aim for 2 days of activity outside of work

## 2020-01-27 ENCOUNTER — OFFICE VISIT (OUTPATIENT)
Dept: BARIATRICS/WEIGHT MGMT | Age: 40
End: 2020-01-27
Payer: COMMERCIAL

## 2020-01-27 VITALS
HEART RATE: 108 BPM | SYSTOLIC BLOOD PRESSURE: 137 MMHG | HEIGHT: 68 IN | WEIGHT: 208 LBS | BODY MASS INDEX: 31.52 KG/M2 | DIASTOLIC BLOOD PRESSURE: 80 MMHG

## 2020-01-27 PROCEDURE — 99213 OFFICE O/P EST LOW 20 MIN: CPT | Performed by: NURSE PRACTITIONER

## 2020-07-21 ASSESSMENT — ENCOUNTER SYMPTOMS
ALLERGIC/IMMUNOLOGIC NEGATIVE: 1
GASTROINTESTINAL NEGATIVE: 1
RESPIRATORY NEGATIVE: 1
EYES NEGATIVE: 1

## 2020-07-22 NOTE — PROGRESS NOTES
[facility]) Physicians   [employer]    2020    TELEHEALTH EVALUATION -- Audio/Visual (During QUNLS-77 public health emergency)    Subjective:      Patient ID: Selma Barry is a 47 y.o. male    HPI    5 years 8 months s/p sleeve gastrectomy    Selma Barry is a 47 y.o. obese male , Body mass index is 31.93 kg/m². Due to the COVID-19 restrictions on close contact interactions the patient's visit was conducted via audio/video in preet of a face to face visit. Patient has consented to have this visit conducted via audio/video and I am conducting it from the office. The patient is here through telemedicine for their post op bariatric surgery visit. Patient denies any nausea, vomiting, fevers, chills, shortness of breath, chest pain, constipation or urinary symptoms. Denies any heartburn nor dysphagia. Past Medical History:   Diagnosis Date    Allergic rhinitis     Asthma     B12 deficiency     Bipolar affect, depressed ([facility] [address]     Dr. Avelino Bradley Hyperlipidemia     Migraine     Morbid obesity ([facility] [address]     Panic disorder     Pre-diabetes      Past Surgical History:   Procedure Laterality Date    SLEEVE GASTRECTOMY  2014    LAPAROSCOPIC    TONSILLECTOMY  4515    UMBILICAL HERNIA REPAIR       Family History   Problem Relation Age of Onset    Diabetes Mother     High Blood Pressure Mother     High Cholesterol Mother     Asthma Mother     Diabetes Father     High Blood Pressure Father     High Cholesterol Father     Diabetes Maternal Aunt     Seizures Brother     Substance Abuse Brother     Glaucoma Brother      Social History     Tobacco Use    Smoking status: Former Smoker     Last attempt to quit: 2013     Years since quittin.4    Smokeless tobacco: Never Used   Substance Use Topics    Alcohol use: Yes     Comment: weekends - 2-3 drinks     I counseled the patient on the importance of not smoking and risks of ETOH.    Allergies   Allergen Reactions    Banana Anaphylaxis    Peanuts [Peanut Oil] Anaphylaxis    Shellfish-Derived Products Anaphylaxis    Benazepril-Hydrochlorothiazide Swelling    Penicillins      Unsure of reaction     Vitals:    07/27/20 1526   Weight: 210 lb (95.3 kg)   Height: 5' 8\" (1.727 m)     Body mass index is 31.93 kg/m². Current Outpatient Medications:     naproxen (NAPROSYN) 500 MG tablet, Take 500 mg by mouth 2 times daily (with meals) Indications: PRN, Disp: , Rfl:     vitamin D (ERGOCALCIFEROL) 84091 units CAPS capsule, Take 50,000 Units by mouth, Disp: , Rfl:     amLODIPine (NORVASC) 5 MG tablet, Take 10 mg by mouth , Disp: , Rfl:     cyclobenzaprine (FLEXERIL) 5 MG tablet, TK 1 T PO TID PRN, Disp: , Rfl: 0    montelukast (SINGULAIR) 10 MG tablet, Take 1 tablet by mouth daily, Disp: , Rfl: 3    Multiple Vitamin (MULTI VITAMIN MENS PO), Take 1 each by mouth daily. , Disp: , Rfl:     albuterol (VENTOLIN HFA) 108 (90 BASE) MCG/ACT inhaler, Inhale 2 puffs into the lungs every 4 hours as needed for Wheezing., Disp: 18 g, Rfl: 4    Cyanocobalamin (VITAMIN B 12) 100 MCG LOZG, Take 2,000 mg by mouth daily. , Disp: , Rfl:     Calcium Citrate-Vitamin D (CITRACAL PETITES/VITAMIN D PO), Take 1 tablet by mouth daily. , Disp: , Rfl:     omeprazole (PRILOSEC) 40 MG capsule, Take 1 capsule by mouth daily. , Disp: 30 capsule, Rfl: 2    budesonide-formoterol (SYMBICORT) 160-4.5 MCG/ACT AERO, Inhale 2 puffs into the lungs 2 times daily. , Disp: 3 Inhaler, Rfl: 1    Lab Results   Component Value Date    WBC 8.0 08/14/2014    RBC 4.94 08/14/2014    HGB 13.8 08/14/2014    HCT 41.4 08/14/2014    MCV 83.8 08/14/2014    MCH 27.9 08/14/2014    MCHC 33.3 08/14/2014    MPV 7.9 08/14/2014    LYMPHOPCT 37.4 03/25/2011    MONOPCT 10.9 03/25/2011    BASOPCT 0.4 03/25/2011    LYMPHSABS 2.5 03/25/2011    MONOSABS 0.7 03/25/2011    EOSABS 0.2 03/25/2011     Lab Results   Component Value Date     08/14/2014    K 3.9 08/14/2014     08/14/2014 visualized signs of difficulty breathing or respiratory distress        [] Abnormal-      Musculoskeletal:   [x] Normal gait with no signs of ataxia         [x] Normal range of motion of neck        [] Abnormal-     Neurological:        [x] No Facial Asymmetry (Cranial nerve 7 motor function) (limited exam to video visit)          [x] No gaze palsy        [] Abnormal-         Skin:        [x] No significant exanthematous lesions or discoloration noted on facial skin         [] Abnormal-            Psychiatric:       [x] Normal Affect [x] No Hallucinations        [] Abnormal-     Other pertinent observable physical exam findings-     Due to this being a TeleHealth encounter, evaluation of the following organ systems is limited: Vitals/Constitutional/EENT/Resp/CV/GI//MS/Neuro/Skin/Heme-Lymph-Imm. Assessment and Plan:   Patient is here via telemedicine and is 5 years 11 months s/p sleeve gastrectomy, up 2 lbs with a total weight loss of 117.5 lbs. The patient's current Body mass index is 31.93 kg/m². (7/27/20). He is doing well, denies n/v/dysphagia or reflux. He is tolerating diet, getting adequate fluids and protein. He had been off track during much of the initial COVID-19 quarantine, but doing better now. Needs to still watch alcohol consumption as those are empty calories. He is more active as COVID-19 restrictions have lessened, but no structured exercise yet. Encouraged intentional physical activity as able. He is taking vitamins as instructed. He did speak with the registered dietitian for continued follow up. I agree with recommendations and plan. We will see him back in 6 months for continued follow up or via telemedicine depending on COVID-19 restrictions at the time of their next appointment. A total of 15 minutes was spent conversing with the patient and over half of that time was spent counseling the patient on proper dietary behaviors, exercise and post-op progress.     An electronic signature was

## 2020-07-27 ENCOUNTER — TELEMEDICINE (OUTPATIENT)
Dept: BARIATRICS/WEIGHT MGMT | Age: 40
End: 2020-07-27
Payer: COMMERCIAL

## 2020-07-27 VITALS — WEIGHT: 210 LBS | HEIGHT: 68 IN | BODY MASS INDEX: 31.83 KG/M2

## 2020-07-27 PROCEDURE — 99213 OFFICE O/P EST LOW 20 MIN: CPT | Performed by: NURSE PRACTITIONER

## 2020-07-27 ASSESSMENT — ENCOUNTER SYMPTOMS
SHORTNESS OF BREATH: 0
COUGH: 0

## 2021-01-21 ASSESSMENT — ENCOUNTER SYMPTOMS
EYES NEGATIVE: 1
GASTROINTESTINAL NEGATIVE: 1
RESPIRATORY NEGATIVE: 1
ALLERGIC/IMMUNOLOGIC NEGATIVE: 1

## 2021-01-21 NOTE — PROGRESS NOTES
[facility]) Physicians   [employer]    2021    TELEHEALTH EVALUATION -- Audio/Visual (During GLWRP-41 public health emergency)    Subjective:      Patient ID: Cari Morales is a 47 y.o. male    HPI    6 years 5 months s/p sleeve gastrectomy    Cari Morales is a 47 y.o. obese male , Body mass index is 32.69 kg/m². Due to the COVID-19 restrictions on close contact interactions the patient's visit was conducted via audio/video in preet of a face to face visit. Patient has consented to have this visit conducted via audio/video and I am conducting it from the office. The patient is here through telemedicine for their post op bariatric surgery visit. Patient denies any nausea, vomiting, fevers, chills, shortness of breath, chest pain, constipation or urinary symptoms. Denies any heartburn nor dysphagia. Past Medical History:   Diagnosis Date    Allergic rhinitis     Asthma 1982    B12 deficiency     Bipolar affect, depressed ([facility] [address]     Dr. Chaya Sanderson Hyperlipidemia     Migraine     Morbid obesity ([facility] [address]     Panic disorder     Pre-diabetes      Past Surgical History:   Procedure Laterality Date    SLEEVE GASTRECTOMY  2014    LAPAROSCOPIC    TONSILLECTOMY  8521    UMBILICAL HERNIA REPAIR       Family History   Problem Relation Age of Onset    Diabetes Mother     High Blood Pressure Mother     High Cholesterol Mother     Asthma Mother     Diabetes Father     High Blood Pressure Father     High Cholesterol Father     Diabetes Maternal Aunt     Seizures Brother     Substance Abuse Brother     Glaucoma Brother      Social History     Tobacco Use    Smoking status: Former Smoker     Quit date: 2013     Years since quittin.9    Smokeless tobacco: Never Used   Substance Use Topics    Alcohol use: Yes     Comment: weekends - 2-3 drinks     I counseled the patient on the importance of not smoking and risks of ETOH.    Allergies   Allergen Reactions  Banana Anaphylaxis    Peanuts [Peanut Oil] Anaphylaxis    Shellfish-Derived Products Anaphylaxis    Benazepril-Hydrochlorothiazide Swelling    Penicillins      Unsure of reaction     Vitals:    01/25/21 1518   Weight: 215 lb (97.5 kg)   Height: 5' 8\" (1.727 m)     Body mass index is 32.69 kg/m². Current Outpatient Medications:     naproxen (NAPROSYN) 500 MG tablet, Take 500 mg by mouth 2 times daily (with meals) Indications: PRN, Disp: , Rfl:     vitamin D (ERGOCALCIFEROL) 45620 units CAPS capsule, Take 50,000 Units by mouth, Disp: , Rfl:     amLODIPine (NORVASC) 5 MG tablet, Take 10 mg by mouth , Disp: , Rfl:     cyclobenzaprine (FLEXERIL) 5 MG tablet, TK 1 T PO TID PRN, Disp: , Rfl: 0    montelukast (SINGULAIR) 10 MG tablet, Take 1 tablet by mouth daily, Disp: , Rfl: 3    Multiple Vitamin (MULTI VITAMIN MENS PO), Take 1 each by mouth daily. , Disp: , Rfl:     albuterol (VENTOLIN HFA) 108 (90 BASE) MCG/ACT inhaler, Inhale 2 puffs into the lungs every 4 hours as needed for Wheezing., Disp: 18 g, Rfl: 4    Cyanocobalamin (VITAMIN B 12) 100 MCG LOZG, Take 2,000 mg by mouth daily. , Disp: , Rfl:     Calcium Citrate-Vitamin D (CITRACAL PETITES/VITAMIN D PO), Take 1 tablet by mouth daily. , Disp: , Rfl:     omeprazole (PRILOSEC) 40 MG capsule, Take 1 capsule by mouth daily. , Disp: 30 capsule, Rfl: 2    budesonide-formoterol (SYMBICORT) 160-4.5 MCG/ACT AERO, Inhale 2 puffs into the lungs 2 times daily. , Disp: 3 Inhaler, Rfl: 1    Lab Results   Component Value Date    WBC 8.0 08/14/2014    RBC 4.94 08/14/2014    HGB 13.8 08/14/2014    HCT 41.4 08/14/2014    MCV 83.8 08/14/2014    MCH 27.9 08/14/2014    MCHC 33.3 08/14/2014    MPV 7.9 08/14/2014    LYMPHOPCT 37.4 03/25/2011    MONOPCT 10.9 03/25/2011    BASOPCT 0.4 03/25/2011    LYMPHSABS 2.5 03/25/2011    MONOSABS 0.7 03/25/2011    EOSABS 0.2 03/25/2011     Lab Results   Component Value Date     08/14/2014    K 3.9 08/14/2014     08/14/2014 CO2 24 08/14/2014    GLUCOSE 77 08/14/2014    BUN 17 08/14/2014    CREATININE 0.8 08/14/2014    LABGLOM >60 08/14/2014    GFRAA >60 08/14/2014    GFRAA >60 11/16/2012    CALCIUM 9.7 08/14/2014    PROT 7.4 08/14/2014    LABALBU 4.4 08/14/2014    AGRATIO 1.5 08/14/2014    BILITOT 0.4 08/14/2014    ALKPHOS 77 08/14/2014    ALT 20 08/14/2014    AST 17 08/14/2014    GLOB 3.0 08/14/2014     Lab Results   Component Value Date    CHOL 205 05/28/2014    TRIG 89 05/28/2014    HDL 50 05/28/2014    HDL 69 06/28/2010    LDLCALC 137 05/28/2014    LABVLDL 18 05/28/2014     No results found for: [facility]  Lab Results   Component Value Date    IRON 71 05/28/2014    TIBC 293 05/28/2014    LABIRON 24 05/28/2014     Lab Results   Component Value Date    OSSEQCQJ67 206 05/28/2014    FOLATE 14.33 05/28/2014     Lab Results   Component Value Date    VITD25 8 05/28/2014     Lab Results   Component Value Date    LABA1C 5.7 05/28/2014    .9 05/28/2014       Review of Systems   Constitutional: Negative. HENT: Negative. Eyes: Negative. Respiratory: Negative. Cardiovascular: Negative. Gastrointestinal: Negative. Endocrine: Negative. Genitourinary: Negative. Musculoskeletal: Negative. Skin: Negative. Allergic/Immunologic: Negative. Neurological: Negative. Hematological: Negative. Psychiatric/Behavioral:        Increased depression and anxiety. PHYSICAL EXAMINATION:    Constitutional: [x] Appears well-developed and well-nourished [x] No apparent distress      [] Abnormal-   Mental status  [x] Alert and awake  [x] Oriented to person/place/time [x]Able to follow commands      Eyes:  EOM    [x]  Normal  [] Abnormal-  Sclera  [x]  Normal  [] Abnormal -         Discharge [x]  None visible  [] Abnormal -    HENT:   [x] Normocephalic, atraumatic.   [] Abnormal     Neck: [x] No visualized mass Pulmonary/Chest: [x] Respiratory effort normal.  [x] No visualized signs of difficulty breathing or respiratory distress        [] Abnormal-      Musculoskeletal:   [] Normal gait with no signs of ataxia         [x] Normal range of motion of neck        [] Abnormal-     Neurological:        [x] No Facial Asymmetry (Cranial nerve 7 motor function) (limited exam to video visit)          [x] No gaze palsy        [] Abnormal-         Skin:        [x] No significant exanthematous lesions or discoloration noted on facial skin         [] Abnormal-            Psychiatric:       [x] Normal Affect [x] No Hallucinations        [] Abnormal-     Other pertinent observable physical exam findings-     Due to this being a TeleHealth encounter, evaluation of the following organ systems is limited: Vitals/Constitutional/EENT/Resp/CV/GI//MS/Neuro/Skin/Heme-Lymph-Imm.     Assessment and Plan: Patient is here via telemedicine and is 6 years 5 months s/p sleeve gastrectomy, up 5 lbs with a total weight loss of 112.5 lbs. The patient's current Body mass index is 32.69 kg/m². (1/25/21). He is doing ok, denies n/v/dysphagia or reflux. He is tolerating diet, getting adequate fluids, but needs to improve on daily protein. Also, needs to watch some food choices and cut back on alcohol. He is not exercising as he has been busy with family, work and business. Encouraged physical activity as able. Dealing with a lot of depression and anxiety. PCP managing for now, but trying to get into a psychiatrist. Did offer for patient to see behaviorist to see if she can help with eating and structure. He is taking multivitamins as instructed, but not taking calcium. He did speak with the registered dietitian for continued follow up. I agree with recommendations and plan. Reviewed recent labs in [facility]. We will see him back in 6 months for continued follow up or via telemedicine depending on COVID-19 restrictions at the time of their next appointment. Total encounter time: 26 minutes, including any number of the following: Bariatric Postoperative work up/protocols, review of labs, imaging, provider notes, outside hospital records, performing examination/evaluation, counseling patient and/or family, ordering medications/tests, placing referrals and communication with referring physicians, coordination of care; discussing exercise and physical activity; discussing dietary plan/recall with the patient as well with registered dietitian and documentation in the EHR. Of note, the above was done during same day of the actual patient encounter. An electronic signature was used to authenticate this note. Pursuant to the emergency declaration under the [facility] [place], [facility] waiver authority and the [employer] and [employer] Act, this Virtual  Visit was conducted, with patient's consent, to reduce the patient's risk of exposure to COVID-19 and provide continuity of care for an established patient. Services were provided through a video synchronous discussion virtually to substitute for in-person clinic visit.

## 2021-01-21 NOTE — PATIENT INSTRUCTIONS
Diet tips to help make you successful postoperatively    Eating habits after surgery will need to be a long-term change. Eating habits are so ingrained that it can be difficult to change so having made these changes for surgery is a great accomplishment. It is important to maintain these new eating habits after surgery. Also, remember that overall health, age, and genetics make each person's weight loss progress different. Do not compare your progress, the amount you eat, or exercise to other patients. ? Protein first at every meal- Eat the protein portion of your meal first. Eating protein helps the body feel full and sends a signal to stop eating. Protein is very important in building tissue in the body. ? Eat at least 4 times per day- This includes protein supplements and small meals with a high amount of protein  ? Chewing your food thoroughly- Eating too quickly and improper chewing can cause pain and vomiting after surgery. ? Slowing down the speed at which you eat- Refill your fork only after you swallow. Adopt a new pattern of eating by taking a bite of food and putting your utensil down between bites. This will help to reduce the feeling of food being stuck.   ? Drink water and other fluids slowly- Drink at least 48 ounces per day minimum. Sip fluids as if they were hot beverages. If you find it difficult to stop gulping liquids, try using a sippy cup or a sport top water bottle. ? Make sure you are eating meals without drinking fluids- After surgery you will not be allowed to drink fluids 30 minutes before, during, or 30 minutes after your meal (30/30/30 rule). This will be a life-long behavior change. The reason for the rule is to keep food from passing through your smaller stomach more rapidly.  This will cause you to feel hungry again shortly after your meal. ? Continue to avoid caffeine and carbonated beverages- Caffeine acts as a diuretic and can be dehydrating as well as irritating to the lining of the stomach. Carbonated beverages release gas and can expand the stomach. ? Continue to keep temptation from your kitchen- Keep your pantry and kitchen cabinets cleaned out of those dangerous foods that might tempt you after surgery (chips, cookies, candy, etc.). ? Continue to increase your exercise program- Increase your daily physical activity. Aim for 5-6 days per week for 30 minutes. Walking is an easy way to get started with exercising. You want exercise to be a regular part of your life after surgery. ? Make sure you have a good support system- There will be many changes and adjustments to make after surgery. It is important to have a supportive friend, family member or co-worker, etc. with whom you can talk. Continue to attend [facility]) Weight Management support groups as they can be helpful in maintaining behaviors. In addition, it is the responsibility of the patient to schedule and follow up on labs and tests completed after surgery. Results will be reviewed at each visit. We recommend you have the following labs completed by your primary care doctor each year after bariatric surgery: Complete Blood Count (CBC), Complete Metabolic Panel (CMP), Iron Studies, Ha1c, Lipid Panel, TSH (Thyroid), Vitamin A, Vitamin B1, Vitamin B12 & Folate and Vitamin D. Patient received dietary handouts and education.

## 2021-01-25 ENCOUNTER — TELEMEDICINE (OUTPATIENT)
Dept: BARIATRICS/WEIGHT MGMT | Age: 41
End: 2021-01-25
Payer: COMMERCIAL

## 2021-01-25 VITALS — HEIGHT: 68 IN | BODY MASS INDEX: 32.58 KG/M2 | WEIGHT: 215 LBS

## 2021-01-25 DIAGNOSIS — E78.5 HYPERLIPIDEMIA, UNSPECIFIED HYPERLIPIDEMIA TYPE: Primary | ICD-10-CM

## 2021-01-25 DIAGNOSIS — E66.9 OBESITY (BMI 30.0-34.9): ICD-10-CM

## 2021-01-25 DIAGNOSIS — I10 ESSENTIAL HYPERTENSION: ICD-10-CM

## 2021-01-25 DIAGNOSIS — Z90.3 S/P GASTRECTOMY: ICD-10-CM

## 2021-01-25 DIAGNOSIS — R73.03 PRE-DIABETES: ICD-10-CM

## 2021-01-25 PROCEDURE — 99213 OFFICE O/P EST LOW 20 MIN: CPT | Performed by: NURSE PRACTITIONER

## 2021-01-25 NOTE — PROGRESS NOTES
Dietary Assessment Note      Vitals:   Vitals:    21 1518   Weight: 215 lb (97.5 kg)   Height: 5' 8\" (1.727 m)    Patient gained 5 lbs over past 6 months. Total Weight Loss: 112.5 lbs    Due to the COVID-19 restrictions on close contact interactions the patient's visit was conducted via telephone in preet of a face to face visit. The patient is here through telemedicine for their post op visit. Labs reviewed: No new nutrition related labs     Protein intake: <60 grams/day     Fluid intake: >64 oz/day    Multivitamin/mineral intake: vegan MVI gummies     Calcium intake: ran out and no longer taking     Other: none     Exercise: None d/t busy with work and family    Nutrition Assessment: 6 year 5 mo s/p sleeve post-op visit. Pt is vegan. Pt feels he get off track with his snacks - eats ~ 6 snacks per day that he gets while at work.      Breakfast: coffee OR bagel with mustard    Snack: reports this has been the worst for him - tortilla chips OR snack cakes     Snack:  tortilla chips OR snack cakes     Lunch: leftovers just egg with peppers/onions     Snack:  tortilla chips OR snack cakes     Snack:  tortilla chips OR snack cakes     Dinner: stir serrano with onions/peppers/sweet potato/pineapple/broccoli with mushrooms + beyond meat    Snack:  tortilla chips OR snack cakes     Snack:  tortilla chips OR snack cakes     Fluids: water, coffee, alcohol    Amount able to eat per sittin.5-2 cups     Following 30 rule: Still struggling with this     Food Intolerances/issues: Is allergic to all nuts except coconut, fish, bananas, all seafood.     Client Concerns: wants to lose ~ 50 lbs     Goals:   Avoid/limit alcohol   See behaviorist - struggles with grazing  Limit portions to 1.5 cups   Focus on protein with meals/snacks - discussed goal of 60-80 g/pro/day   Take 2 Calcium in evening   Pack/plan and take healthier snacks with him    Handouts: Vegan meal plan - emailed to pt Plan: F/U at 9 years and per Provider     Tayler Wu

## 2021-07-27 ASSESSMENT — ENCOUNTER SYMPTOMS
RESPIRATORY NEGATIVE: 1
EYES NEGATIVE: 1
ALLERGIC/IMMUNOLOGIC NEGATIVE: 1
GASTROINTESTINAL NEGATIVE: 1

## 2021-07-27 NOTE — PROGRESS NOTES
[facility]) Physicians   [employer]    2021    TELEHEALTH EVALUATION -- Audio/Visual (During PAXOO-64 public health emergency)    Subjective:      Patient ID: Mark Lombardo is a 46 y.o. male    HPI    8 years 8 months s/p sleeve gastrectomy    Mark Lombardo is a 46 y.o. obese male , Body mass index is 33.75 kg/m². Due to the COVID-19 restrictions on close contact interactions the patient's visit was conducted via audio/video in preet of a face to face visit. Patient has consented to have this visit conducted via audio/video and I am conducting it from the office. The patient is here through telemedicine for their post op bariatric surgery visit. Patient denies any nausea, vomiting, fevers, chills, shortness of breath, chest pain, constipation or urinary symptoms. Denies any heartburn nor dysphagia. Past Medical History:   Diagnosis Date    Allergic rhinitis     Asthma 1982    B12 deficiency     Bipolar affect, depressed ([facility] [address]     Dr. Rajiv Pena Hyperlipidemia     Migraine     Morbid obesity ([facility] [address]     Panic disorder     Pre-diabetes      Past Surgical History:   Procedure Laterality Date    SLEEVE GASTRECTOMY  2014    LAPAROSCOPIC    TONSILLECTOMY      UMBILICAL HERNIA REPAIR       Family History   Problem Relation Age of Onset    Diabetes Mother     High Blood Pressure Mother     High Cholesterol Mother     Asthma Mother     Diabetes Father     High Blood Pressure Father     High Cholesterol Father     Diabetes Maternal Aunt     Seizures Brother     Substance Abuse Brother     Glaucoma Brother      Social History     Tobacco Use    Smoking status: Former Smoker     Quit date: 2013     Years since quittin.5    Smokeless tobacco: Never Used   Substance Use Topics    Alcohol use: Yes     Comment: weekends - 2-3 drinks     I counseled the patient on the importance of not smoking and risks of ETOH.    Allergies   Allergen Reactions    Banana Anaphylaxis    Peanuts [Peanut Oil] Anaphylaxis    Shellfish-Derived Products Anaphylaxis    Benazepril-Hydrochlorothiazide Swelling    Penicillins      Unsure of reaction     Vitals:    08/04/21 1407   Weight: 222 lb (100.7 kg)   Height: 5' 8\" (1.727 m)     Body mass index is 33.75 kg/m². Current Outpatient Medications:     FLUoxetine (PROZAC) 40 MG capsule, Take 1 capsule by mouth daily, Disp: , Rfl:     naproxen (NAPROSYN) 500 MG tablet, Take 500 mg by mouth 2 times daily (with meals) Indications: PRN, Disp: , Rfl:     vitamin D (ERGOCALCIFEROL) 56166 units CAPS capsule, Take 50,000 Units by mouth, Disp: , Rfl:     amLODIPine (NORVASC) 5 MG tablet, Take 10 mg by mouth , Disp: , Rfl:     cyclobenzaprine (FLEXERIL) 5 MG tablet, TK 1 T PO TID PRN, Disp: , Rfl: 0    montelukast (SINGULAIR) 10 MG tablet, Take 1 tablet by mouth daily, Disp: , Rfl: 3    Multiple Vitamin (MULTI VITAMIN MENS PO), Take 1 each by mouth daily. , Disp: , Rfl:     albuterol (VENTOLIN HFA) 108 (90 BASE) MCG/ACT inhaler, Inhale 2 puffs into the lungs every 4 hours as needed for Wheezing., Disp: 18 g, Rfl: 4    Cyanocobalamin (VITAMIN B 12) 100 MCG LOZG, Take 2,000 mg by mouth daily. , Disp: , Rfl:     Calcium Citrate-Vitamin D (CITRACAL PETITES/VITAMIN D PO), Take 1 tablet by mouth daily. , Disp: , Rfl:     omeprazole (PRILOSEC) 40 MG capsule, Take 1 capsule by mouth daily. , Disp: 30 capsule, Rfl: 2    budesonide-formoterol (SYMBICORT) 160-4.5 MCG/ACT AERO, Inhale 2 puffs into the lungs 2 times daily. , Disp: 3 Inhaler, Rfl: 1    Lab Results   Component Value Date    WBC 8.0 08/14/2014    RBC 4.94 08/14/2014    HGB 13.8 08/14/2014    HCT 41.4 08/14/2014    MCV 83.8 08/14/2014    MCH 27.9 08/14/2014    MCHC 33.3 08/14/2014    MPV 7.9 08/14/2014    LYMPHOPCT 37.4 03/25/2011    MONOPCT 10.9 03/25/2011    BASOPCT 0.4 03/25/2011    LYMPHSABS 2.5 03/25/2011    MONOSABS 0.7 03/25/2011    EOSABS 0.2 03/25/2011     Lab Results   Component Value Date     08/14/2014    K 3.9 08/14/2014     08/14/2014    CO2 24 08/14/2014    GLUCOSE 77 08/14/2014    BUN 17 08/14/2014    CREATININE 0.8 08/14/2014    LABGLOM >60 08/14/2014    GFRAA >60 08/14/2014    GFRAA >60 11/16/2012    CALCIUM 9.7 08/14/2014    PROT 7.4 08/14/2014    LABALBU 4.4 08/14/2014    AGRATIO 1.5 08/14/2014    BILITOT 0.4 08/14/2014    ALKPHOS 77 08/14/2014    ALT 20 08/14/2014    AST 17 08/14/2014    GLOB 3.0 08/14/2014     Lab Results   Component Value Date    CHOL 205 05/28/2014    TRIG 89 05/28/2014    HDL 50 05/28/2014    HDL 69 06/28/2010    LDLCALC 137 05/28/2014    LABVLDL 18 05/28/2014     No results found for: [facility]  Lab Results   Component Value Date    IRON 71 05/28/2014    TIBC 293 05/28/2014    LABIRON 24 05/28/2014     Lab Results   Component Value Date    [id number] 163 05/28/2014    FOLATE 14.33 05/28/2014     Lab Results   Component Value Date    VITD25 8 05/28/2014     Lab Results   Component Value Date    LABA1C 5.7 05/28/2014    .9 05/28/2014       Review of Systems   Constitutional: Negative. HENT: Negative. Eyes: Negative. Respiratory: Negative. Cardiovascular: Negative. Gastrointestinal: Negative. Endocrine: Negative. Genitourinary: Negative. Musculoskeletal: Negative. Skin: Negative. Allergic/Immunologic: Negative. Neurological: Negative. Hematological: Negative. Psychiatric/Behavioral:        Depression - seeing PCP and was started on Paxil. PHYSICAL EXAMINATION:    Constitutional: [x] Appears well-developed and well-nourished [x] No apparent distress      [] Abnormal-   Mental status  [x] Alert and awake  [x] Oriented to person/place/time [x]Able to follow commands      Eyes:  EOM    [x]  Normal  [] Abnormal-  Sclera  [x]  Normal  [] Abnormal -         Discharge [x]  None visible  [] Abnormal -    HENT:   [x] Normocephalic, atraumatic.   [] Abnormal     Neck: [x] No visualized mass     Pulmonary/Chest: [x] Respiratory effort normal.  [x] No visualized signs of difficulty breathing or respiratory distress        [] Abnormal-      Musculoskeletal:   [] Normal gait with no signs of ataxia         [x] Normal range of motion of neck        [] Abnormal-     Neurological:        [x] No Facial Asymmetry (Cranial nerve 7 motor function) (limited exam to video visit)          [x] No gaze palsy        [] Abnormal-         Skin:        [x] No significant exanthematous lesions or discoloration noted on facial skin         [] Abnormal-            Psychiatric:       [x] Normal Affect [] No Hallucinations        [] Abnormal-     Other pertinent observable physical exam findings-     Due to this being a TeleHealth encounter, evaluation of the following organ systems is limited: Vitals/Constitutional/EENT/Resp/CV/GI//MS/Neuro/Skin/Heme-Lymph-Imm. Assessment and Plan:   Patient is here via telemedicine and is 8 years 11 months s/p sleeve gastrectomy, up 7 lbs with a total weight loss of 105.5 lbs. The patient's current Body mass index is 33.75 kg/m². (8/4/21). He is doing ok, denies n/v/dysphagia or reflux. He is tolerating diet, getting adequate fluids and protein, but has been off track with grazing/snacking due to depression and boredom. Doesn't feel Paxil is helping much. Recommended he continue to follow up with PCP on dosage and effectiveness. Offered for patient to see behaviorist and he declined for now. He is exercising with some walking and occasionally will use his elliptical at home. Encouraged consistent physical activity. He is taking vitamins as instructed. He did speak with the registered dietitian for continued follow up. I agree with recommendations and plan. We will see him back in 6 months for continued follow up or via telemedicine depending on COVID-19 restrictions at the time of their next appointment.      Essential Hypertension:  [x] Continue to make dietary and lifestyle modifications per our recommendations. [x] Reviewed the importance of checking blood pressure. [x] Continue to follow up with their PCP for medication management (Norvasc) and monitoring. Prediabetes:   [x] Continue to make dietary and lifestyle modifications per our recommendations. [] Reviewed the importance of checking blood sugars. [] Continue to follow up with their PCP for medication management and monitoring. Hyperlipidemia:   [x] Continue to make dietary and lifestyle modifications per our recommendations. [] Continue to follow up with their PCP for medication management and monitoring. Obesity:  [x] Continue to make dietary and lifestyle modifications per our recommendations. [x] Return for follow-up 6 months. Total encounter time: 30 minutes, including any number of the following: Bariatric Postoperative work up/protocols, review of labs, imaging, provider notes, outside hospital records, performing examination/evaluation, counseling patient and/or family, ordering medications/tests, placing referrals and communication with referring physicians, coordination of care; discussing exercise and physical activity; discussing dietary plan/recall with the patient as well with registered dietitian and documentation in the EHR. Of note, the above was done during same day of the actual patient encounter. An electronic signature was used to authenticate this note. Pursuant to the emergency declaration under the [address] [facility] waiver authority and the [employer] Act, this Virtual  Visit was conducted, with patient's consent, to reduce the patient's risk of exposure to COVID-19 and provide continuity of care for an established patient. Services were provided through a video synchronous discussion virtually to substitute for in-person clinic visit.

## 2021-07-27 NOTE — PATIENT INSTRUCTIONS
Diet tips to help make you successful postoperatively    Eating habits after surgery will need to be a long-term change. Eating habits are so ingrained that it can be difficult to change so having made these changes for surgery is a great accomplishment. It is important to maintain these new eating habits after surgery. Also, remember that overall health, age, and genetics make each person's weight loss progress different. Do not compare your progress, the amount you eat, or exercise to other patients.  Protein first at every meal- Eat the protein portion of your meal first. Eating protein helps the body feel full and sends a signal to stop eating. Protein is very important in building tissue in the body.  Eat at least 4 times per day- This includes protein supplements and small meals with a high amount of protein   Chewing your food thoroughly- Eating too quickly and improper chewing can cause pain and vomiting after surgery.  Slowing down the speed at which you eat- Refill your fork only after you swallow. Adopt a new pattern of eating by taking a bite of food and putting your utensil down between bites. This will help to reduce the feeling of food being stuck.    Drink water and other fluids slowly- Drink at least 48 ounces per day minimum. Sip fluids as if they were hot beverages. If you find it difficult to stop gulping liquids, try using a sippy cup or a sport top water bottle.  Make sure you are eating meals without drinking fluids- After surgery you will not be allowed to drink fluids 30 minutes before, during, or 30 minutes after your meal (30/30/30 rule). This will be a life-long behavior change. The reason for the rule is to keep food from passing through your smaller stomach more rapidly.  This will cause you to feel hungry again shortly after your meal.   Continue to avoid caffeine and carbonated beverages- Caffeine acts as a diuretic and can be dehydrating as well as irritating to the lining of the stomach. Carbonated beverages release gas and can expand the stomach.  Continue to keep temptation from your kitchen- Keep your pantry and kitchen cabinets cleaned out of those dangerous foods that might tempt you after surgery (chips, cookies, candy, etc.).  Continue to increase your exercise program- Increase your daily physical activity. Aim for 5-6 days per week for 30 minutes. Walking is an easy way to get started with exercising. You want exercise to be a regular part of your life after surgery.  Make sure you have a good support system- There will be many changes and adjustments to make after surgery. It is important to have a supportive friend, family member or co-worker, etc. with whom you can talk. Continue to attend [facility]) Weight Management support groups as they can be helpful in maintaining behaviors. In addition, it is the responsibility of the patient to schedule and follow up on labs and tests completed after surgery. Results will be reviewed at each visit. We recommend you have the following labs completed by your primary care doctor each year after bariatric surgery: Complete Blood Count (CBC), Complete Metabolic Panel (CMP), Iron Studies, HgbA1c, Lipid Panel, TSH (Thyroid), Vitamin A, Vitamin B1, Vitamin B12 & Folate and Vitamin D. Patient received dietary handouts and education.     Goals:  - Track intake, aim for 1200     - Avoid grazing / structure meals eat 4-5x/day  - Planning to f/u with PCP on prozac

## 2021-08-04 ENCOUNTER — TELEMEDICINE (OUTPATIENT)
Dept: BARIATRICS/WEIGHT MGMT | Age: 41
End: 2021-08-04
Payer: COMMERCIAL

## 2021-08-04 VITALS — BODY MASS INDEX: 33.65 KG/M2 | WEIGHT: 222 LBS | HEIGHT: 68 IN

## 2021-08-04 DIAGNOSIS — I10 ESSENTIAL HYPERTENSION: ICD-10-CM

## 2021-08-04 DIAGNOSIS — R73.03 PRE-DIABETES: ICD-10-CM

## 2021-08-04 DIAGNOSIS — E78.5 HYPERLIPIDEMIA, UNSPECIFIED HYPERLIPIDEMIA TYPE: Primary | ICD-10-CM

## 2021-08-04 DIAGNOSIS — E66.9 OBESITY (BMI 30.0-34.9): ICD-10-CM

## 2021-08-04 DIAGNOSIS — Z90.3 S/P GASTRECTOMY: ICD-10-CM

## 2021-08-04 PROCEDURE — 99214 OFFICE O/P EST MOD 30 MIN: CPT | Performed by: NURSE PRACTITIONER

## 2021-08-04 RX ORDER — FLUOXETINE HYDROCHLORIDE 40 MG/1
1 CAPSULE ORAL DAILY
COMMUNITY
Start: 2021-02-01

## 2021-08-04 NOTE — PROGRESS NOTES
Dietary Assessment Note  Vitals:   Vitals:    21 1407   Weight: 222 lb (100.7 kg)   Height: 5' 8\" (1.727 m)   Patient gained 7 lbs over past ~7 months, per pt report. Total Weight Loss: 105.5 lbs    Labs reviewed: no new labs    Protein intake: 60-80 grams/day     Fluid intake: >64 oz/day- water / alcohol intake got worse for a little bit but has declined now    Multivitamin/mineral intake: yes - vegan MVI gummies    Calcium intake: yes - 2 per day    Other: Vit D    Exercise: limited with time - busy with work    Nutrition Assessment: 7 year post-op visit. Pt is vegan. Pt is struggling with snacking, out of boredom & depression. Behaviorist was discussed, pt isn't ready for this. B- coffee & water / sometimes part of his lunch (splits)  S- potato chips / Bobbi Cea / \"whatevers around me\"  L- 1/2 vegan egg sandwich & vegetables  S- same as AM   D- broccoli & black beans w/ impossible plant based grounds  S- sometimes chips    Amount able to eat per sittin.5-2 cups volume    Following 30/30/30 rule: no, never really has, will take sips    Food Intolerances/issues: allergic to all nuts / coconut / fish / bananas / seafood    Client Concerns: wt gain / feels like prozac is affecting his appetite, started about 1 year ago (increase happened 3-4 months ago) (sometimes feels like he can't get enough)     Goals:  - Track intake, aim for 1200     - Avoid grazing / structure meals eat 4-5x/day  - Planning to f/u with PCP on prozac    Plan: f/u as directed    Due to the COVID-19 restrictions on close contact interactions the patient's visit was conducted via telephone in preet of a face to face visit. The patient is here through telemedicine for their post-op visit.     Anderson Javier RD, LD